# Patient Record
Sex: FEMALE | Race: OTHER | NOT HISPANIC OR LATINO
[De-identification: names, ages, dates, MRNs, and addresses within clinical notes are randomized per-mention and may not be internally consistent; named-entity substitution may affect disease eponyms.]

---

## 2023-11-27 PROBLEM — Z00.00 ENCOUNTER FOR PREVENTIVE HEALTH EXAMINATION: Status: ACTIVE | Noted: 2023-11-27

## 2023-12-01 ENCOUNTER — APPOINTMENT (OUTPATIENT)
Dept: CARDIOLOGY | Facility: CLINIC | Age: 63
End: 2023-12-01
Payer: COMMERCIAL

## 2023-12-01 VITALS
DIASTOLIC BLOOD PRESSURE: 70 MMHG | SYSTOLIC BLOOD PRESSURE: 106 MMHG | WEIGHT: 225 LBS | OXYGEN SATURATION: 96 % | HEART RATE: 66 BPM | BODY MASS INDEX: 36.16 KG/M2 | HEIGHT: 66 IN

## 2023-12-01 VITALS — DIASTOLIC BLOOD PRESSURE: 72 MMHG | SYSTOLIC BLOOD PRESSURE: 110 MMHG

## 2023-12-01 DIAGNOSIS — U07.1 COVID-19: ICD-10-CM

## 2023-12-01 DIAGNOSIS — Q21.12 PATENT FORAMEN OVALE: ICD-10-CM

## 2023-12-01 DIAGNOSIS — Z82.49 FAMILY HISTORY OF ISCHEMIC HEART DISEASE AND OTHER DISEASES OF THE CIRCULATORY SYSTEM: ICD-10-CM

## 2023-12-01 DIAGNOSIS — Z78.9 OTHER SPECIFIED HEALTH STATUS: ICD-10-CM

## 2023-12-01 PROCEDURE — 99205 OFFICE O/P NEW HI 60 MIN: CPT

## 2023-12-01 PROCEDURE — 99215 OFFICE O/P EST HI 40 MIN: CPT

## 2023-12-12 ENCOUNTER — OUTPATIENT (OUTPATIENT)
Dept: OUTPATIENT SERVICES | Facility: HOSPITAL | Age: 63
LOS: 1 days | End: 2023-12-12
Payer: COMMERCIAL

## 2023-12-12 DIAGNOSIS — G47.33 OBSTRUCTIVE SLEEP APNEA (ADULT) (PEDIATRIC): ICD-10-CM

## 2023-12-12 PROCEDURE — 95800 SLP STDY UNATTENDED: CPT | Mod: 26

## 2023-12-12 PROCEDURE — 95800 SLP STDY UNATTENDED: CPT

## 2023-12-13 ENCOUNTER — APPOINTMENT (OUTPATIENT)
Dept: NEUROLOGY | Facility: CLINIC | Age: 63
End: 2023-12-13
Payer: COMMERCIAL

## 2023-12-13 VITALS
OXYGEN SATURATION: 96 % | WEIGHT: 225 LBS | TEMPERATURE: 97.9 F | DIASTOLIC BLOOD PRESSURE: 84 MMHG | SYSTOLIC BLOOD PRESSURE: 126 MMHG | HEART RATE: 64 BPM | HEIGHT: 66 IN | BODY MASS INDEX: 36.16 KG/M2 | RESPIRATION RATE: 14 BRPM

## 2023-12-13 DIAGNOSIS — I67.1 CEREBRAL ANEURYSM, NONRUPTURED: ICD-10-CM

## 2023-12-13 PROCEDURE — 99215 OFFICE O/P EST HI 40 MIN: CPT

## 2023-12-13 RX ORDER — ATORVASTATIN CALCIUM 40 MG/1
40 TABLET, FILM COATED ORAL DAILY
Qty: 90 | Refills: 3 | Status: DISCONTINUED | COMMUNITY
End: 2023-12-13

## 2023-12-13 NOTE — REVIEW OF SYSTEMS
[As Noted in HPI] : as noted in HPI [FreeTextEntry2] :  10 systems were reviewed and negative as noted in the HPI

## 2023-12-13 NOTE — HISTORY OF PRESENT ILLNESS
[FreeTextEntry1] : Ms. Funk, a 63-year-old female with history of PFO (not repaired, has known for about 20 years), migraines, HLD, who presented to [facility] [place], [place] on 11/18/23 with LLE weakness, presents today for hospital follow up. She states that she initially experienced bilateral leg weakness, causing her to fall. Right leg weakness then resolved. MRI brain showed tiny posterior right frontal lobe, right parietal lobe, and posterior paramedian left frontal lobe infarctions. MARYLU showed EF 45-50%. As per hospital documentation, the patient's hospital course was complicated by new onset PAfib with RVR. B/l LE doppler was negative for DVT. CTA chest was negative for PE. She is on Eliquis 5 mg BID. She notes 3 days ago she saw blood on the toilet paper when she wiped but denies constantino blood in the urine or stool. She notes some restlessness of her legs since starting atorvastatin but denies myalgias and arthralgias and is overall tolerating it well. She saw Dr. Mancini a few days ago and was taken off ASA. She has a follow up ECHO in March. She has scheduled an ablation and ILR placement on March 12th with Dr. Bernardino Montelongo. Currently, she feels well. She denies residual left leg weakness. She has a history of migraines and experiences tension headaches at least 1x/week, relieved with OTC medication. Denies interval TIA/Stroke symptoms. She ambulates and performs ADLS independently.

## 2023-12-13 NOTE — PHYSICAL EXAM
[FreeTextEntry1] :  GENERAL APPEARANCE: Well developed, well-nourished woman in no acute distress.   NEUROLOGIC EXAM:  MENTAL STATUS: Alert and Oriented to person, place and time. Speech is fluent, without aphasia or dysarthria Behavior and affect appropriate to situation.                         CRANIAL NERVES: CN 2:    Visual fields are full to confrontation. CN 3, 4, 6: Extraocular movements are intact. No nystagmus or ophthalmoplegia is evident. Pupils are equally round and reactive to light. CN 5:     Facial sensation is intact to light touch in all 3 divisions CN 7:     Facial excursion is full and symmetric bilaterally.  MOTOR: Strength is 5/5 throughout for age and stature. No orbiting or drift noted.  SENSORY: Intact to light touch and perception in all four extremities without extinction to double simultaneous stimulation.  COORD: Finger to nose testing without dysmetria bilaterally.  GAIT: Normal station and gait. Romberg negative

## 2023-12-13 NOTE — DISCUSSION/SUMMARY
[FreeTextEntry1] : Ms. Funk, a 63-year-old female with history of PFO (not repaired), migraines, HLD, [place] [facility] [place] and was found to have tiny posterior right frontal lobe, right parietal lobe, and posterior paramedian left frontal lobe infarctions. Stroke etiology likely cardioembolic due to new onset paroxysmal afib discovered during her hospitalization. No focal neurological deficits on current exam.   I have personally reviewed available neuroradiological images. MRI head- tiny posterior right frontal lobe, right parietal lobe, and posterior paramedian left frontal lobe stroke. No hemorrhage, no mass effect, no tumor. CTA H/N- No intracranial or extracranial stenosis. Small vertebro-basilar system. 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   11/19/2023 LDL-118  Patient advised to continue anticoagulation therapy for PAF and statin therapy for secondary stroke prevention. Will follow up with Cardiology for scheduled ablation and ILR placement in March.  Aggressive risk factor modification should be continued for secondary stroke prevention, consisting of anticoagulation therapy with, intensive blood pressure control and lipid lowering therapy. I have reviewed the goals of stroke risk factor modification. I counseled the patient on measures to reduce stroke risk, including the importance of medication compliance, risk factor control, exercise, healthy diet, and avoidance of smoking.  Patient was educated about signs and symptoms of stroke and was counseled to contact 911 upon emergence of stroke-like symptoms. Intravenous thrombolysis and mechanical thrombectomy was also counseled and educated to the patient today.  Discussed imaging findings with the patient about possible left PICA aneurysm. I recommend diagnostic cerebral angiogram to better visualize intracranial vasculature and evaluate for possible cerebral aneurysm.  The goals, benefits, alternatives, and risks of the procedure were discussed with the patient, and she agrees to proceed. Risks including, but not limited to stroke, dissection, groin hematoma, contrast reaction, and subarachnoid hemorrhage due to aneurysm rupture, were discussed with the patient. The patient is in agreement to proceed with the procedure.  Patient educated that if she should experience a severe headache, this could indicate possible aneurysmal rupture, and she should go to the ED immediately.  PLAN: 1. Continue Eliquis 5 mg BID for PAF and secondary stroke prevention. 2. Continue statin therapy, goal LDL <70 3. Strict blood pressure and blood sugar control 4. I recommend diagnostic cerebral angiogram for further visualization of possible left PICA aneurysm - Plan for February 15, 2024 5. Follow up with cardiology.  Follow up with me after the procedure, or sooner if the need should arise.

## 2023-12-14 RX ORDER — ATORVASTATIN CALCIUM 40 MG/1
40 TABLET, FILM COATED ORAL
Qty: 90 | Refills: 3 | Status: ACTIVE | COMMUNITY
Start: 1900-01-01 | End: 1900-01-01

## 2023-12-14 RX ORDER — METOPROLOL SUCCINATE 25 MG/1
25 TABLET, EXTENDED RELEASE ORAL DAILY
Qty: 90 | Refills: 3 | Status: ACTIVE | COMMUNITY
Start: 1900-01-01 | End: 1900-01-01

## 2023-12-18 DIAGNOSIS — G47.33 OBSTRUCTIVE SLEEP APNEA (ADULT) (PEDIATRIC): ICD-10-CM

## 2024-01-08 ENCOUNTER — NON-APPOINTMENT (OUTPATIENT)
Age: 64
End: 2024-01-08

## 2024-02-08 ENCOUNTER — APPOINTMENT (OUTPATIENT)
Dept: PULMONOLOGY | Facility: CLINIC | Age: 64
End: 2024-02-08
Payer: COMMERCIAL

## 2024-02-08 VITALS
WEIGHT: 220 LBS | HEART RATE: 64 BPM | HEIGHT: 66 IN | DIASTOLIC BLOOD PRESSURE: 74 MMHG | BODY MASS INDEX: 35.36 KG/M2 | OXYGEN SATURATION: 94 % | SYSTOLIC BLOOD PRESSURE: 124 MMHG | RESPIRATION RATE: 16 BRPM

## 2024-02-08 DIAGNOSIS — G47.33 OBSTRUCTIVE SLEEP APNEA (ADULT) (PEDIATRIC): ICD-10-CM

## 2024-02-08 DIAGNOSIS — E66.9 OBESITY, UNSPECIFIED: ICD-10-CM

## 2024-02-08 PROCEDURE — 99203 OFFICE O/P NEW LOW 30 MIN: CPT

## 2024-02-08 NOTE — PHYSICAL EXAM
[No Acute Distress] : no acute distress [Elongated Uvula] : elongated uvula [Enlarged Base of the Tongue] : enlarged base of the tongue [II] : Mallampati Class: II [Normal Appearance] : normal appearance [Neck Circumference: ___] : neck circumference: [unfilled] [No Neck Mass] : no neck mass [No Resp Distress] : no resp distress [Clear to Auscultation Bilaterally] : clear to auscultation bilaterally

## 2024-02-08 NOTE — CONSULT LETTER
[Dear  ___] : Dear  [unfilled], [Consult Letter:] : I had the pleasure of evaluating your patient, [unfilled]. [Please see my note below.] : Please see my note below. [Consult Closing:] : Thank you very much for allowing me to participate in the care of this patient.  If you have any questions, please do not hesitate to contact me. [Sincerely,] : Sincerely, [DrEarle  ___] : Dr. TURNER

## 2024-02-08 NOTE — HISTORY OF PRESENT ILLNESS
impaired [TextBox_4] : 2/8/24  63 Obese Afib CVA CHF PFO Snoring, EDS [Obstructive Sleep Apnea] : obstructive sleep apnea [Awakes Unrefreshed] : awakes unrefreshed [Awakes with Dry Mouth] : awakes with dry mouth [Daytime Somnolence] : daytime somnolence [Nonrestorative Sleep] : nonrestorative sleep [Recent  Weight Gain] : recent weight gain [Snoring] : snoring [ESS] : 8

## 2024-02-08 NOTE — DISCUSSION/SUMMARY
[FreeTextEntry1] : Assessment  Obesity  Mild SHAREE with moderate sleep fragmentation  Plan   Weight loss APAP 3 month FU

## 2024-02-15 ENCOUNTER — APPOINTMENT (OUTPATIENT)
Dept: NEUROLOGY | Facility: HOSPITAL | Age: 64
End: 2024-02-15

## 2024-03-07 ENCOUNTER — APPOINTMENT (OUTPATIENT)
Dept: CARDIOLOGY | Facility: CLINIC | Age: 64
End: 2024-03-07
Payer: COMMERCIAL

## 2024-03-07 VITALS
HEIGHT: 66 IN | HEART RATE: 58 BPM | BODY MASS INDEX: 35.36 KG/M2 | OXYGEN SATURATION: 98 % | DIASTOLIC BLOOD PRESSURE: 76 MMHG | SYSTOLIC BLOOD PRESSURE: 116 MMHG | WEIGHT: 220 LBS

## 2024-03-07 DIAGNOSIS — Z79.899 OTHER LONG TERM (CURRENT) DRUG THERAPY: ICD-10-CM

## 2024-03-07 DIAGNOSIS — I42.8 OTHER CARDIOMYOPATHIES: ICD-10-CM

## 2024-03-07 DIAGNOSIS — I34.0 NONRHEUMATIC MITRAL (VALVE) INSUFFICIENCY: ICD-10-CM

## 2024-03-07 DIAGNOSIS — Z79.01 LONG TERM (CURRENT) USE OF ANTICOAGULANTS: ICD-10-CM

## 2024-03-07 DIAGNOSIS — I63.9 CEREBRAL INFARCTION, UNSPECIFIED: ICD-10-CM

## 2024-03-07 DIAGNOSIS — E78.00 PURE HYPERCHOLESTEROLEMIA, UNSPECIFIED: ICD-10-CM

## 2024-03-07 DIAGNOSIS — I48.0 PAROXYSMAL ATRIAL FIBRILLATION: ICD-10-CM

## 2024-03-07 PROCEDURE — 99215 OFFICE O/P EST HI 40 MIN: CPT

## 2024-03-07 PROCEDURE — 93306 TTE W/DOPPLER COMPLETE: CPT

## 2024-03-07 PROCEDURE — G2211 COMPLEX E/M VISIT ADD ON: CPT

## 2024-03-07 PROCEDURE — 99214 OFFICE O/P EST MOD 30 MIN: CPT

## 2024-03-07 NOTE — HISTORY OF PRESENT ILLNESS
[FreeTextEntry1] : Historical Perspective: 63 year old female with no significant PMHx [place] [facility] [place] and found to have small ischemic CVA with PAF. Patient underwent echocardiogram which revealed LV EF 45-50%. Patient spontaneously cardioverted back to NSR and was placed on Toprol XL, Eliquis, and atorvastatin. She was discharged home and is here to establish care in our office.  Patient has no chest pain, dyspnea or palpitations.  There is no history of MI or previous coronary intervention.  Current Health Status: Patient with no chest pain, SOB, or palpitations. No hospitalizations since seeing me last. Remains compliant with medications and reports no adverse effects. Diagnosed with SHAREE and saw sleep specialist. Pending APAP.

## 2024-03-07 NOTE — CARDIOLOGY SUMMARY
[de-identified] : 3/7/2024, LV EF 60%, mild MR 11/19/2023, LV EF 45-50%, mild-moderate MR [de-identified] : 11/19/2023, Atrial Fibrillation with RVR [de-identified] : 12/14/2023, CTA of Coronary Arteries: normal coronary arteries.

## 2024-03-07 NOTE — DISCUSSION/SUMMARY
[FreeTextEntry1] : 1. Ischemic CVA: found to have PAF during November 2023 hospitalization. Recommend continuing Eliquis 5mg BID (high risk medication with no signs of toxicity) and atorvastatin 40mg daily with goal LDL less than 70.  2. PAF: spontaneously cardioverted back to NSR. Continue Eliquis 5mg BID given [id number] 3. Discussed NSAID interaction. Recommend continuing Toprol XL 25mg daily. Patient diagnosed with SHAREE and saw sleep specialist. Now pending APAP.  3. Cardiomyopathy: Non-ischemic. 11/19/2023, echocardiogram revealed LV EF 45-50%. Recommended CTA of the coronary arteries, 12/14/2023, which demonstrated normal coronary arteries. Repeat echocardiogram from 3/7/2024, revealed improvement in LV EF to 60%. Continue Toprol XL 25mg daily.  4. HLD: continue atorvastatin 40mg daily with goal LDL less than 70.  5. Mitral Regurgitation: mild-moderate on echocardiogram from 11/19/2023. Improvement to mild on echocardiogram from 3/7/2024, now that patient is back in NSR. Periodic echo surveillance.   Follow up in 6 months.

## 2024-03-07 NOTE — PHYSICAL EXAM
[de-identified] :  No carotid bruits auscultated bilaterally. [Normal] : moves all extremities, no focal deficits, normal speech

## 2024-06-19 RX ORDER — APIXABAN 5 MG/1
5 TABLET, FILM COATED ORAL
Qty: 90 | Refills: 3 | Status: ACTIVE | COMMUNITY
Start: 1900-01-01 | End: 1900-01-01

## 2024-10-02 ENCOUNTER — NON-APPOINTMENT (OUTPATIENT)
Age: 64
End: 2024-10-02

## 2024-10-03 ENCOUNTER — NON-APPOINTMENT (OUTPATIENT)
Age: 64
End: 2024-10-03

## 2024-10-03 ENCOUNTER — APPOINTMENT (OUTPATIENT)
Dept: CARDIOLOGY | Facility: CLINIC | Age: 64
End: 2024-10-03
Payer: COMMERCIAL

## 2024-10-03 VITALS
HEART RATE: 61 BPM | OXYGEN SATURATION: 95 % | SYSTOLIC BLOOD PRESSURE: 122 MMHG | HEIGHT: 66 IN | WEIGHT: 232 LBS | DIASTOLIC BLOOD PRESSURE: 80 MMHG | BODY MASS INDEX: 37.28 KG/M2

## 2024-10-03 DIAGNOSIS — Z79.899 OTHER LONG TERM (CURRENT) DRUG THERAPY: ICD-10-CM

## 2024-10-03 DIAGNOSIS — I42.8 OTHER CARDIOMYOPATHIES: ICD-10-CM

## 2024-10-03 DIAGNOSIS — I48.0 PAROXYSMAL ATRIAL FIBRILLATION: ICD-10-CM

## 2024-10-03 DIAGNOSIS — Z79.01 LONG TERM (CURRENT) USE OF ANTICOAGULANTS: ICD-10-CM

## 2024-10-03 DIAGNOSIS — E78.00 PURE HYPERCHOLESTEROLEMIA, UNSPECIFIED: ICD-10-CM

## 2024-10-03 DIAGNOSIS — I34.0 NONRHEUMATIC MITRAL (VALVE) INSUFFICIENCY: ICD-10-CM

## 2024-10-03 PROCEDURE — 93000 ELECTROCARDIOGRAM COMPLETE: CPT

## 2024-10-03 PROCEDURE — G2211 COMPLEX E/M VISIT ADD ON: CPT | Mod: NC

## 2024-10-03 PROCEDURE — 99215 OFFICE O/P EST HI 40 MIN: CPT

## 2024-10-03 PROCEDURE — 99214 OFFICE O/P EST MOD 30 MIN: CPT

## 2024-10-03 NOTE — CARDIOLOGY SUMMARY
[de-identified] : 10/3/2024, NSR, normal ECG 11/19/2023, Atrial Fibrillation with RVR [de-identified] : 12/14/2023, CTA of Coronary Arteries: normal coronary arteries. [de-identified] : 3/7/2024, LV EF 60%, mild MR 11/19/2023, LV EF 45-50%, mild-moderate MR

## 2024-10-03 NOTE — PHYSICAL EXAM
[Normal] : moves all extremities, no focal deficits, normal speech [de-identified] :  No carotid bruits auscultated bilaterally.

## 2024-10-03 NOTE — DISCUSSION/SUMMARY
[FreeTextEntry1] : 1. Ischemic CVA: found to have PAF during November 2023 hospitalization. Recommend continuing Eliquis 5mg BID (high risk medication with no signs of toxicity) and atorvastatin 40mg daily with goal LDL less than 70.  2. PAF: spontaneously cardioverted back to NSR. Patient also underwent ablation, August 2024, in [place].  Continue Eliquis 5mg BID given [id number] 3. Discussed NSAID interaction. Recommend continuing Toprol XL 25mg daily. Patient diagnosed with SHAREE and saw sleep specialist.  3. Cardiomyopathy: Non-ischemic. 11/19/2023, echocardiogram revealed LV EF 45-50%. Recommended CTA of the coronary arteries, 12/14/2023, which demonstrated normal coronary arteries. Repeat echocardiogram from 3/7/2024, revealed improvement in LV EF to 60%. Continue Toprol XL 25mg daily.  4. HLD: continue atorvastatin 40mg daily with goal LDL less than 70.  5. Mitral Regurgitation: mild-moderate on echocardiogram from 11/19/2023. Improvement to mild on echocardiogram from 3/7/2024, now that patient is back in NSR. Periodic echo surveillance.   Follow up in 6 months. [EKG obtained to assist in diagnosis and management of assessed problem(s)] : EKG obtained to assist in diagnosis and management of assessed problem(s)

## 2024-10-03 NOTE — HISTORY OF PRESENT ILLNESS
[FreeTextEntry1] : Historical Perspective: 63 year old female with no significant PMHx presented to [facility] [place] in [place] and found to have small ischemic CVA with PAF. Patient underwent echocardiogram which revealed LV EF 45-50%. Patient spontaneously cardioverted back to NSR and was placed on Toprol XL, Eliquis, and atorvastatin. She was discharged home and is here to establish care in our office.  Patient has no chest pain, dyspnea or palpitations.  There is no history of MI or previous coronary intervention.  Current Health Status: Patient with no chest pain, SOB, or palpitations. No hospitalizations since seeing me last. Remains compliant with medications and reports no adverse effects. Diagnosed with SHAREE and saw sleep specialist. Patient also underwent AF ablation, August 2024 in [place].

## 2024-11-26 ENCOUNTER — NON-APPOINTMENT (OUTPATIENT)
Age: 64
End: 2024-11-26

## 2024-11-26 ENCOUNTER — APPOINTMENT (OUTPATIENT)
Dept: CARDIOLOGY | Facility: CLINIC | Age: 64
End: 2024-11-26
Payer: COMMERCIAL

## 2024-11-26 VITALS
WEIGHT: 225 LBS | HEART RATE: 70 BPM | SYSTOLIC BLOOD PRESSURE: 120 MMHG | HEIGHT: 66 IN | DIASTOLIC BLOOD PRESSURE: 66 MMHG | OXYGEN SATURATION: 97 % | BODY MASS INDEX: 36.16 KG/M2

## 2024-11-26 DIAGNOSIS — I42.8 OTHER CARDIOMYOPATHIES: ICD-10-CM

## 2024-11-26 DIAGNOSIS — Z79.01 LONG TERM (CURRENT) USE OF ANTICOAGULANTS: ICD-10-CM

## 2024-11-26 DIAGNOSIS — I34.0 NONRHEUMATIC MITRAL (VALVE) INSUFFICIENCY: ICD-10-CM

## 2024-11-26 DIAGNOSIS — E78.00 PURE HYPERCHOLESTEROLEMIA, UNSPECIFIED: ICD-10-CM

## 2024-11-26 DIAGNOSIS — I48.0 PAROXYSMAL ATRIAL FIBRILLATION: ICD-10-CM

## 2024-11-26 PROCEDURE — G2211 COMPLEX E/M VISIT ADD ON: CPT | Mod: NC

## 2024-11-26 PROCEDURE — 93000 ELECTROCARDIOGRAM COMPLETE: CPT

## 2024-11-26 PROCEDURE — 99214 OFFICE O/P EST MOD 30 MIN: CPT

## 2025-04-17 ENCOUNTER — APPOINTMENT (OUTPATIENT)
Dept: CARDIOLOGY | Facility: CLINIC | Age: 65
End: 2025-04-17
Payer: COMMERCIAL

## 2025-04-17 VITALS
HEART RATE: 68 BPM | OXYGEN SATURATION: 97 % | BODY MASS INDEX: 34.55 KG/M2 | SYSTOLIC BLOOD PRESSURE: 112 MMHG | WEIGHT: 215 LBS | DIASTOLIC BLOOD PRESSURE: 60 MMHG | HEIGHT: 66 IN

## 2025-04-17 DIAGNOSIS — I42.8 OTHER CARDIOMYOPATHIES: ICD-10-CM

## 2025-04-17 DIAGNOSIS — I34.0 NONRHEUMATIC MITRAL (VALVE) INSUFFICIENCY: ICD-10-CM

## 2025-04-17 DIAGNOSIS — I63.9 CEREBRAL INFARCTION, UNSPECIFIED: ICD-10-CM

## 2025-04-17 DIAGNOSIS — Z79.01 LONG TERM (CURRENT) USE OF ANTICOAGULANTS: ICD-10-CM

## 2025-04-17 DIAGNOSIS — I48.0 PAROXYSMAL ATRIAL FIBRILLATION: ICD-10-CM

## 2025-04-17 PROCEDURE — 93306 TTE W/DOPPLER COMPLETE: CPT

## 2025-04-17 PROCEDURE — 99214 OFFICE O/P EST MOD 30 MIN: CPT

## 2025-04-17 PROCEDURE — G2211 COMPLEX E/M VISIT ADD ON: CPT | Mod: NC

## 2025-04-17 RX ORDER — AMLODIPINE BESYLATE 2.5 MG/1
2.5 TABLET ORAL DAILY
Refills: 0 | Status: ACTIVE | COMMUNITY

## 2025-04-22 ENCOUNTER — APPOINTMENT (OUTPATIENT)
Dept: CARDIOLOGY | Facility: CLINIC | Age: 65
End: 2025-04-22
Payer: COMMERCIAL

## 2025-04-22 VITALS
WEIGHT: 218 LBS | OXYGEN SATURATION: 97 % | BODY MASS INDEX: 35.19 KG/M2 | HEART RATE: 66 BPM | SYSTOLIC BLOOD PRESSURE: 112 MMHG | DIASTOLIC BLOOD PRESSURE: 74 MMHG

## 2025-04-22 DIAGNOSIS — E78.00 PURE HYPERCHOLESTEROLEMIA, UNSPECIFIED: ICD-10-CM

## 2025-04-22 DIAGNOSIS — E66.9 OBESITY, UNSPECIFIED: ICD-10-CM

## 2025-04-22 DIAGNOSIS — G47.33 OBSTRUCTIVE SLEEP APNEA (ADULT) (PEDIATRIC): ICD-10-CM

## 2025-04-22 DIAGNOSIS — Z71.89 OTHER SPECIFIED COUNSELING: ICD-10-CM

## 2025-04-22 PROCEDURE — 99214 OFFICE O/P EST MOD 30 MIN: CPT

## 2025-04-22 RX ORDER — TIRZEPATIDE 7.5 MG/.5ML
7.5 INJECTION, SOLUTION SUBCUTANEOUS
Refills: 0 | Status: DISCONTINUED | COMMUNITY
End: 2025-04-22

## 2025-04-22 RX ORDER — SODIUM PICOSULFATE, MAGNESIUM OXIDE, AND ANHYDROUS CITRIC ACID 12; 3.5; 1 G/175ML; G/175ML; MG/175ML
10-3.5-12 MG-GM LIQUID ORAL
Qty: 350 | Refills: 0 | Status: ACTIVE | COMMUNITY
Start: 2024-09-04

## 2025-04-22 RX ORDER — TIRZEPATIDE 7.5 MG/.5ML
7.5 INJECTION, SOLUTION SUBCUTANEOUS
Qty: 1 | Refills: 0 | Status: ACTIVE | COMMUNITY

## 2025-04-28 RX ORDER — TIRZEPATIDE 7.5 MG/.5ML
7.5 INJECTION, SOLUTION SUBCUTANEOUS
Qty: 1 | Refills: 3 | Status: ACTIVE | COMMUNITY
Start: 2025-04-22

## 2025-07-14 ENCOUNTER — APPOINTMENT (OUTPATIENT)
Dept: CARDIOLOGY | Facility: CLINIC | Age: 65
End: 2025-07-14

## 2025-08-16 ENCOUNTER — APPOINTMENT (OUTPATIENT)
Dept: CARDIOLOGY | Facility: CLINIC | Age: 65
End: 2025-08-16
Payer: COMMERCIAL

## 2025-08-16 VITALS
WEIGHT: 205 LBS | HEIGHT: 66 IN | SYSTOLIC BLOOD PRESSURE: 92 MMHG | DIASTOLIC BLOOD PRESSURE: 68 MMHG | BODY MASS INDEX: 32.95 KG/M2 | HEART RATE: 59 BPM | OXYGEN SATURATION: 98 %

## 2025-08-16 DIAGNOSIS — I48.0 PAROXYSMAL ATRIAL FIBRILLATION: ICD-10-CM

## 2025-08-16 DIAGNOSIS — I34.0 NONRHEUMATIC MITRAL (VALVE) INSUFFICIENCY: ICD-10-CM

## 2025-08-16 DIAGNOSIS — Z79.01 LONG TERM (CURRENT) USE OF ANTICOAGULANTS: ICD-10-CM

## 2025-08-16 DIAGNOSIS — E78.00 PURE HYPERCHOLESTEROLEMIA, UNSPECIFIED: ICD-10-CM

## 2025-08-16 DIAGNOSIS — I63.9 CEREBRAL INFARCTION, UNSPECIFIED: ICD-10-CM

## 2025-08-16 LAB — HBA1C MFR BLD HPLC: 5.2

## 2025-08-16 PROCEDURE — 99214 OFFICE O/P EST MOD 30 MIN: CPT

## 2025-08-16 PROCEDURE — G2211 COMPLEX E/M VISIT ADD ON: CPT | Mod: NC

## 2025-08-16 PROCEDURE — 93000 ELECTROCARDIOGRAM COMPLETE: CPT

## 2025-08-16 RX ORDER — METOPROLOL TARTRATE 25 MG/1
25 TABLET ORAL
Qty: 180 | Refills: 3 | Status: ACTIVE | COMMUNITY
Start: 2025-08-16 | End: 1900-01-01